# Patient Record
Sex: MALE | Race: BLACK OR AFRICAN AMERICAN | Employment: UNEMPLOYED | URBAN - METROPOLITAN AREA
[De-identification: names, ages, dates, MRNs, and addresses within clinical notes are randomized per-mention and may not be internally consistent; named-entity substitution may affect disease eponyms.]

---

## 2017-01-01 ENCOUNTER — CARE COORDINATION (OUTPATIENT)
Dept: CARE COORDINATION | Age: 82
End: 2017-01-01

## 2017-01-01 ENCOUNTER — TELEPHONE (OUTPATIENT)
Dept: INTERNAL MEDICINE CLINIC | Age: 82
End: 2017-01-01

## 2017-01-01 ENCOUNTER — HOSPITAL ENCOUNTER (OUTPATIENT)
Dept: OTHER | Age: 82
Discharge: HOME | End: 2017-03-30
Attending: INTERNAL MEDICINE | Admitting: INTERNAL MEDICINE

## 2017-01-01 ENCOUNTER — OFFICE VISIT (OUTPATIENT)
Dept: INTERNAL MEDICINE CLINIC | Age: 82
End: 2017-01-01

## 2017-01-01 ENCOUNTER — OFFICE VISIT (OUTPATIENT)
Dept: CARDIOLOGY CLINIC | Age: 82
End: 2017-01-01

## 2017-01-01 ENCOUNTER — OFFICE VISIT (OUTPATIENT)
Dept: VASCULAR SURGERY | Age: 82
End: 2017-01-01

## 2017-01-01 ENCOUNTER — PROCEDURE VISIT (OUTPATIENT)
Dept: VASCULAR SURGERY | Age: 82
End: 2017-01-01

## 2017-01-01 ENCOUNTER — HOSPITAL ENCOUNTER (OUTPATIENT)
Dept: MRI IMAGING | Age: 82
Discharge: HOME | End: 2017-03-31
Attending: INTERNAL MEDICINE | Admitting: INTERNAL MEDICINE

## 2017-01-01 VITALS
RESPIRATION RATE: 16 BRPM | HEART RATE: 71 BPM | SYSTOLIC BLOOD PRESSURE: 142 MMHG | BODY MASS INDEX: 23.34 KG/M2 | WEIGHT: 181.8 LBS | DIASTOLIC BLOOD PRESSURE: 60 MMHG

## 2017-01-01 VITALS — SYSTOLIC BLOOD PRESSURE: 135 MMHG | OXYGEN SATURATION: 96 % | DIASTOLIC BLOOD PRESSURE: 61 MMHG | HEART RATE: 87 BPM

## 2017-01-01 VITALS
RESPIRATION RATE: 14 BRPM | HEART RATE: 67 BPM | DIASTOLIC BLOOD PRESSURE: 55 MMHG | OXYGEN SATURATION: 100 % | SYSTOLIC BLOOD PRESSURE: 117 MMHG

## 2017-01-01 VITALS
DIASTOLIC BLOOD PRESSURE: 80 MMHG | HEIGHT: 74 IN | WEIGHT: 195 LBS | BODY MASS INDEX: 25.03 KG/M2 | SYSTOLIC BLOOD PRESSURE: 128 MMHG

## 2017-01-01 VITALS — DIASTOLIC BLOOD PRESSURE: 59 MMHG | HEART RATE: 59 BPM | RESPIRATION RATE: 14 BRPM | SYSTOLIC BLOOD PRESSURE: 136 MMHG

## 2017-01-01 VITALS
WEIGHT: 195 LBS | DIASTOLIC BLOOD PRESSURE: 61 MMHG | BODY MASS INDEX: 25.04 KG/M2 | OXYGEN SATURATION: 100 % | SYSTOLIC BLOOD PRESSURE: 146 MMHG | HEART RATE: 70 BPM

## 2017-01-01 VITALS
RESPIRATION RATE: 16 BRPM | DIASTOLIC BLOOD PRESSURE: 71 MMHG | SYSTOLIC BLOOD PRESSURE: 163 MMHG | TEMPERATURE: 98 F | HEART RATE: 82 BPM

## 2017-01-01 VITALS
BODY MASS INDEX: 25.03 KG/M2 | DIASTOLIC BLOOD PRESSURE: 50 MMHG | WEIGHT: 195 LBS | SYSTOLIC BLOOD PRESSURE: 118 MMHG | HEIGHT: 74 IN

## 2017-01-01 DIAGNOSIS — K21.9 GASTROESOPHAGEAL REFLUX DISEASE WITHOUT ESOPHAGITIS: ICD-10-CM

## 2017-01-01 DIAGNOSIS — I70.634: Primary | ICD-10-CM

## 2017-01-01 DIAGNOSIS — I73.9 PAD (PERIPHERAL ARTERY DISEASE) (HCC): Primary | ICD-10-CM

## 2017-01-01 DIAGNOSIS — N18.30 STAGE 3 CHRONIC KIDNEY DISEASE (HCC): ICD-10-CM

## 2017-01-01 DIAGNOSIS — M54.50 CHRONIC BILATERAL LOW BACK PAIN WITHOUT SCIATICA: ICD-10-CM

## 2017-01-01 DIAGNOSIS — Z48.812 ENCOUNTER FOR POSTOPERATIVE SURVEILLANCE OF VASCULAR BYPASS SURGERY: Primary | ICD-10-CM

## 2017-01-01 DIAGNOSIS — M86.271 SUBACUTE OSTEOMYELITIS OF RIGHT FOOT (HCC): ICD-10-CM

## 2017-01-01 DIAGNOSIS — G89.29 CHRONIC BILATERAL LOW BACK PAIN WITHOUT SCIATICA: ICD-10-CM

## 2017-01-01 DIAGNOSIS — D50.0 IRON DEFICIENCY ANEMIA DUE TO CHRONIC BLOOD LOSS: ICD-10-CM

## 2017-01-01 DIAGNOSIS — L89.153 SACRAL DECUBITUS ULCER, STAGE III (HCC): Primary | ICD-10-CM

## 2017-01-01 DIAGNOSIS — D64.9 NORMOCYTIC ANEMIA: ICD-10-CM

## 2017-01-01 DIAGNOSIS — R91.8 INDETERMINATE PULMONARY NODULES: ICD-10-CM

## 2017-01-01 DIAGNOSIS — D64.9 NORMOCYTIC ANEMIA: Primary | ICD-10-CM

## 2017-01-01 DIAGNOSIS — I48.0 PAROXYSMAL ATRIAL FIBRILLATION (HCC): Primary | ICD-10-CM

## 2017-01-01 DIAGNOSIS — I73.9 PAD (PERIPHERAL ARTERY DISEASE) (HCC): ICD-10-CM

## 2017-01-01 DIAGNOSIS — I10 ESSENTIAL HYPERTENSION: Primary | ICD-10-CM

## 2017-01-01 DIAGNOSIS — R60.0 BILATERAL EDEMA OF LOWER EXTREMITY: ICD-10-CM

## 2017-01-01 DIAGNOSIS — I48.20 CHRONIC ATRIAL FIBRILLATION (HCC): ICD-10-CM

## 2017-01-01 DIAGNOSIS — M79.671 RIGHT FOOT PAIN: ICD-10-CM

## 2017-01-01 DIAGNOSIS — M24.561 FLEXION CONTRACTURE OF RIGHT KNEE: ICD-10-CM

## 2017-01-01 DIAGNOSIS — R93.6 ABNORMAL X-RAY OF EXTREMITY: ICD-10-CM

## 2017-01-01 DIAGNOSIS — Z23 NEED FOR PROPHYLACTIC VACCINATION AGAINST STREPTOCOCCUS PNEUMONIAE (PNEUMOCOCCUS): ICD-10-CM

## 2017-01-01 DIAGNOSIS — I10 ESSENTIAL HYPERTENSION: ICD-10-CM

## 2017-01-01 DIAGNOSIS — R93.6 ABNORMAL X-RAY OF EXTREMITY: Primary | ICD-10-CM

## 2017-01-01 DIAGNOSIS — I10 HTN (HYPERTENSION), BENIGN: ICD-10-CM

## 2017-01-01 DIAGNOSIS — I48.0 PAROXYSMAL ATRIAL FIBRILLATION (HCC): ICD-10-CM

## 2017-01-01 DIAGNOSIS — I73.9 PVD (PERIPHERAL VASCULAR DISEASE) (HCC): ICD-10-CM

## 2017-01-01 DIAGNOSIS — D64.9 ANEMIA, UNSPECIFIED TYPE: Primary | ICD-10-CM

## 2017-01-01 DIAGNOSIS — R73.09 ELEVATED GLUCOSE: ICD-10-CM

## 2017-01-01 DIAGNOSIS — D51.9 ANEMIA DUE TO VITAMIN B12 DEFICIENCY, UNSPECIFIED B12 DEFICIENCY TYPE: ICD-10-CM

## 2017-01-01 DIAGNOSIS — H40.9 GLAUCOMA, UNSPECIFIED GLAUCOMA, UNSPECIFIED LATERALITY: ICD-10-CM

## 2017-01-01 DIAGNOSIS — N18.3 CKD (CHRONIC KIDNEY DISEASE), STAGE 3 (MODERATE): ICD-10-CM

## 2017-01-01 DIAGNOSIS — C32.0 MALIGNANT NEOPLASM OF GLOTTIS (HCC): ICD-10-CM

## 2017-01-01 DIAGNOSIS — R93.7 ABNORMAL FINDINGS ON DIAGNOSTIC IMAGING OF OTHER PARTS OF MUSCULOSKELETAL SYSTEM: ICD-10-CM

## 2017-01-01 DIAGNOSIS — Z23 NEED FOR INFLUENZA VACCINATION: ICD-10-CM

## 2017-01-01 DIAGNOSIS — D64.9 ANEMIA, NORMOCYTIC NORMOCHROMIC: Primary | ICD-10-CM

## 2017-01-01 DIAGNOSIS — I25.10 ATHEROSCLEROSIS OF NATIVE CORONARY ARTERY OF NATIVE HEART WITHOUT ANGINA PECTORIS: ICD-10-CM

## 2017-01-01 DIAGNOSIS — D64.9 ANEMIA, NORMOCYTIC NORMOCHROMIC: ICD-10-CM

## 2017-01-01 DIAGNOSIS — R73.03 PREDIABETES: ICD-10-CM

## 2017-01-01 DIAGNOSIS — L97.919 ULCER OF RIGHT LEG, WITH UNSPECIFIED SEVERITY (HCC): ICD-10-CM

## 2017-01-01 DIAGNOSIS — I63.50 CEREBRAL ARTERY OCCLUSION WITH CEREBRAL INFARCTION (HCC): ICD-10-CM

## 2017-01-01 DIAGNOSIS — E78.00 PURE HYPERCHOLESTEROLEMIA: ICD-10-CM

## 2017-01-01 DIAGNOSIS — I70.261 ATHEROSCLEROSIS OF NATIVE ARTERY OF RIGHT LOWER EXTREMITY WITH GANGRENE (HCC): Primary | ICD-10-CM

## 2017-01-01 DIAGNOSIS — I70.261 ATHEROSCLEROSIS OF NATIVE ARTERY OF RIGHT LOWER EXTREMITY WITH GANGRENE (HCC): ICD-10-CM

## 2017-01-01 LAB
A/G RATIO: 0.6 (ref 1.1–2.2)
A/G RATIO: 0.7 (ref 1.1–2.2)
A/G RATIO: 0.7 (ref 1.1–2.2)
A/G RATIO: 0.8 (ref 1.1–2.2)
ALBUMIN SERPL-MCNC: 2.4 G/DL (ref 3.1–4.9)
ALBUMIN SERPL-MCNC: 2.9 G/DL (ref 3.4–5)
ALBUMIN SERPL-MCNC: 3 G/DL (ref 3.4–5)
ALBUMIN SERPL-MCNC: 3 G/DL (ref 3.4–5)
ALBUMIN SERPL-MCNC: 3.1 G/DL (ref 3.4–5)
ALP BLD-CCNC: 107 U/L (ref 40–129)
ALP BLD-CCNC: 107 U/L (ref 40–129)
ALP BLD-CCNC: 118 U/L (ref 40–129)
ALP BLD-CCNC: 150 U/L (ref 40–129)
ALPHA-1-GLOBULIN: 0.4 G/DL (ref 0.2–0.4)
ALPHA-2-GLOBULIN: 1.2 G/DL (ref 0.4–1.1)
ALT SERPL-CCNC: 10 U/L (ref 10–40)
ALT SERPL-CCNC: 10 U/L (ref 10–40)
ALT SERPL-CCNC: <5 U/L (ref 10–40)
ALT SERPL-CCNC: <5 U/L (ref 10–40)
ANION GAP SERPL CALCULATED.3IONS-SCNC: 14 MMOL/L (ref 3–16)
ANION GAP SERPL CALCULATED.3IONS-SCNC: 15 MMOL/L (ref 3–16)
ANION GAP SERPL CALCULATED.3IONS-SCNC: 16 MMOL/L (ref 3–16)
ANION GAP SERPL CALCULATED.3IONS-SCNC: 20 MMOL/L (ref 3–16)
AST SERPL-CCNC: 10 U/L (ref 15–37)
AST SERPL-CCNC: 10 U/L (ref 15–37)
AST SERPL-CCNC: 13 U/L (ref 15–37)
AST SERPL-CCNC: 14 U/L (ref 15–37)
BASOPHILS ABSOLUTE: 0 K/UL (ref 0–0.2)
BASOPHILS ABSOLUTE: 0 K/UL (ref 0–0.2)
BASOPHILS ABSOLUTE: 0.1 K/UL (ref 0–0.2)
BASOPHILS ABSOLUTE: 0.1 K/UL (ref 0–0.2)
BASOPHILS RELATIVE PERCENT: 0.6 %
BASOPHILS RELATIVE PERCENT: 0.9 %
BASOPHILS RELATIVE PERCENT: 1 %
BASOPHILS RELATIVE PERCENT: 1.3 %
BETA GLOBULIN: 1.4 G/DL (ref 0.9–1.6)
BILIRUB SERPL-MCNC: 0.3 MG/DL (ref 0–1)
BILIRUB SERPL-MCNC: <0.2 MG/DL (ref 0–1)
BUN BLDV-MCNC: 12 MG/DL (ref 7–20)
BUN BLDV-MCNC: 12 MG/DL (ref 7–20)
BUN BLDV-MCNC: 28 MG/DL (ref 7–20)
BUN BLDV-MCNC: 9 MG/DL (ref 7–20)
C-REACTIVE PROTEIN: 68.2 MG/L (ref 0–5.1)
CALCIUM SERPL-MCNC: 8.4 MG/DL (ref 8.3–10.6)
CALCIUM SERPL-MCNC: 8.8 MG/DL (ref 8.3–10.6)
CALCIUM SERPL-MCNC: 9 MG/DL (ref 8.3–10.6)
CALCIUM SERPL-MCNC: 9.2 MG/DL (ref 8.3–10.6)
CHLORIDE BLD-SCNC: 101 MMOL/L (ref 99–110)
CHLORIDE BLD-SCNC: 101 MMOL/L (ref 99–110)
CHLORIDE BLD-SCNC: 104 MMOL/L (ref 99–110)
CHLORIDE BLD-SCNC: 105 MMOL/L (ref 99–110)
CHOLESTEROL, TOTAL: 126 MG/DL
CHOLESTEROL/HDL RATIO: 2.2
CO2: 17 MMOL/L (ref 21–32)
CO2: 20 MMOL/L (ref 21–32)
CO2: 22 MMOL/L (ref 21–32)
CO2: 25 MMOL/L (ref 21–32)
CREAT SERPL-MCNC: 0.7 MG/DL (ref 0.8–1.3)
CREAT SERPL-MCNC: 0.9 MG/DL (ref 0.8–1.3)
EOSINOPHILS ABSOLUTE: 0.2 K/UL (ref 0–0.6)
EOSINOPHILS ABSOLUTE: 0.2 K/UL (ref 0–0.6)
EOSINOPHILS ABSOLUTE: 0.3 K/UL (ref 0–0.6)
EOSINOPHILS ABSOLUTE: 0.3 K/UL (ref 0–0.6)
EOSINOPHILS RELATIVE PERCENT: 2.9 %
EOSINOPHILS RELATIVE PERCENT: 3.4 %
EOSINOPHILS RELATIVE PERCENT: 3.7 %
EOSINOPHILS RELATIVE PERCENT: 5.5 %
ESTIMATED AVERAGE GLUCOSE: 119.8 MG/DL
FERRITIN: 575.5 NG/ML (ref 30–400)
FERRITIN: 861.6 NG/ML (ref 30–400)
FERRITIN: 867.6 NG/ML (ref 30–400)
FOLATE: >20 NG/ML (ref 4.78–24.2)
GAMMA GLOBULIN: 2.4 G/DL (ref 0.6–1.8)
GFR AFRICAN AMERICAN: >60
GFR NON-AFRICAN AMERICAN: >60
GLOBULIN: 3.9 G/DL
GLOBULIN: 4.3 G/DL
GLOBULIN: 4.3 G/DL
GLOBULIN: 4.8 G/DL
GLUCOSE BLD-MCNC: 84 MG/DL (ref 70–99)
GLUCOSE BLD-MCNC: 87 MG/DL (ref 70–99)
GLUCOSE BLD-MCNC: 92 MG/DL (ref 70–99)
GLUCOSE BLD-MCNC: 94 MG/DL (ref 70–99)
GRAM STAIN RESULT: ABNORMAL
HAPTOGLOBIN: 426 MG/DL (ref 30–200)
HBA1C MFR BLD: 5.8 %
HCT VFR BLD CALC: 26.7 % (ref 40.5–52.5)
HCT VFR BLD CALC: 27.9 % (ref 40.5–52.5)
HCT VFR BLD CALC: 29.7 % (ref 40.5–52.5)
HCT VFR BLD CALC: 31.2 % (ref 40.5–52.5)
HDLC SERPL-MCNC: 58 MG/DL (ref 35–70)
HEMOGLOBIN: 10.1 G/DL (ref 13.5–17.5)
HEMOGLOBIN: 8.3 G/DL (ref 13.5–17.5)
HEMOGLOBIN: 8.5 G/DL (ref 13.5–17.5)
HEMOGLOBIN: 9.8 G/DL (ref 13.5–17.5)
IRON SATURATION: 17 % (ref 20–50)
IRON SATURATION: 25 % (ref 20–50)
IRON SATURATION: 37 % (ref 20–50)
IRON: 30 UG/DL (ref 59–158)
IRON: 50 UG/DL (ref 59–158)
IRON: 68 UG/DL (ref 59–158)
LDL CHOLESTEROL CALCULATED: 54 MG/DL (ref 0–160)
LYMPHOCYTES ABSOLUTE: 1 K/UL (ref 1–5.1)
LYMPHOCYTES ABSOLUTE: 1.1 K/UL (ref 1–5.1)
LYMPHOCYTES ABSOLUTE: 1.4 K/UL (ref 1–5.1)
LYMPHOCYTES ABSOLUTE: 1.7 K/UL (ref 1–5.1)
LYMPHOCYTES RELATIVE PERCENT: 20.4 %
LYMPHOCYTES RELATIVE PERCENT: 21.5 %
LYMPHOCYTES RELATIVE PERCENT: 22.5 %
LYMPHOCYTES RELATIVE PERCENT: 23.7 %
MCH RBC QN AUTO: 28.5 PG (ref 26–34)
MCH RBC QN AUTO: 29.3 PG (ref 26–34)
MCH RBC QN AUTO: 30.8 PG (ref 26–34)
MCH RBC QN AUTO: 31 PG (ref 26–34)
MCHC RBC AUTO-ENTMCNC: 29.9 G/DL (ref 31–36)
MCHC RBC AUTO-ENTMCNC: 31.7 G/DL (ref 31–36)
MCHC RBC AUTO-ENTMCNC: 32.5 G/DL (ref 31–36)
MCHC RBC AUTO-ENTMCNC: 33.1 G/DL (ref 31–36)
MCV RBC AUTO: 92.2 FL (ref 80–100)
MCV RBC AUTO: 93.6 FL (ref 80–100)
MCV RBC AUTO: 94.6 FL (ref 80–100)
MCV RBC AUTO: 95.5 FL (ref 80–100)
MONOCYTES ABSOLUTE: 0.4 K/UL (ref 0–1.3)
MONOCYTES ABSOLUTE: 0.5 K/UL (ref 0–1.3)
MONOCYTES ABSOLUTE: 0.5 K/UL (ref 0–1.3)
MONOCYTES ABSOLUTE: 0.6 K/UL (ref 0–1.3)
MONOCYTES RELATIVE PERCENT: 7.9 %
MONOCYTES RELATIVE PERCENT: 8.2 %
MONOCYTES RELATIVE PERCENT: 8.7 %
MONOCYTES RELATIVE PERCENT: 9.8 %
NEUTROPHILS ABSOLUTE: 3.1 K/UL (ref 1.7–7.7)
NEUTROPHILS ABSOLUTE: 3.2 K/UL (ref 1.7–7.7)
NEUTROPHILS ABSOLUTE: 4.5 K/UL (ref 1.7–7.7)
NEUTROPHILS ABSOLUTE: 4.5 K/UL (ref 1.7–7.7)
NEUTROPHILS RELATIVE PERCENT: 61.3 %
NEUTROPHILS RELATIVE PERCENT: 62.9 %
NEUTROPHILS RELATIVE PERCENT: 65.6 %
NEUTROPHILS RELATIVE PERCENT: 68.2 %
ORGANISM: ABNORMAL
PDW BLD-RTO: 15.2 % (ref 12.4–15.4)
PDW BLD-RTO: 18.7 % (ref 12.4–15.4)
PDW BLD-RTO: 19.8 % (ref 12.4–15.4)
PDW BLD-RTO: 21.1 % (ref 12.4–15.4)
PLATELET # BLD: 216 K/UL (ref 135–450)
PLATELET # BLD: 259 K/UL (ref 135–450)
PLATELET # BLD: 379 K/UL (ref 135–450)
PLATELET # BLD: 389 K/UL (ref 135–450)
PMV BLD AUTO: 8.8 FL (ref 5–10.5)
PMV BLD AUTO: 8.8 FL (ref 5–10.5)
PMV BLD AUTO: 9 FL (ref 5–10.5)
PMV BLD AUTO: 9.4 FL (ref 5–10.5)
POTASSIUM SERPL-SCNC: 4.1 MMOL/L (ref 3.5–5.1)
POTASSIUM SERPL-SCNC: 4.1 MMOL/L (ref 3.5–5.1)
POTASSIUM SERPL-SCNC: 4.4 MMOL/L (ref 3.5–5.1)
POTASSIUM SERPL-SCNC: 4.6 MMOL/L (ref 3.5–5.1)
RBC # BLD: 2.9 M/UL (ref 4.2–5.9)
RBC # BLD: 2.92 M/UL (ref 4.2–5.9)
RBC # BLD: 3.17 M/UL (ref 4.2–5.9)
RBC # BLD: 3.29 M/UL (ref 4.2–5.9)
REASON FOR REJECTION: NORMAL
REJECTED TEST: NORMAL
SEDIMENTATION RATE, ERYTHROCYTE: 47 MM/HR (ref 0–20)
SODIUM BLD-SCNC: 139 MMOL/L (ref 136–145)
SODIUM BLD-SCNC: 140 MMOL/L (ref 136–145)
SODIUM BLD-SCNC: 140 MMOL/L (ref 136–145)
SODIUM BLD-SCNC: 141 MMOL/L (ref 136–145)
SPE/IFE INTERPRETATION: NORMAL
TOTAL IRON BINDING CAPACITY: 172 UG/DL (ref 260–445)
TOTAL IRON BINDING CAPACITY: 186 UG/DL (ref 260–445)
TOTAL IRON BINDING CAPACITY: 201 UG/DL (ref 260–445)
TOTAL PROTEIN: 7 G/DL (ref 6.4–8.2)
TOTAL PROTEIN: 7.2 G/DL (ref 6.4–8.2)
TOTAL PROTEIN: 7.3 G/DL (ref 6.4–8.2)
TOTAL PROTEIN: 7.8 G/DL (ref 6.4–8.2)
TRIGL SERPL-MCNC: 68 MG/DL
TSH SERPL DL<=0.05 MIU/L-ACNC: 1.26 UIU/ML (ref 0.27–4.2)
URIC ACID, SERUM: 5.1 MG/DL (ref 3.5–7.2)
VITAMIN B-12: 1443 PG/ML (ref 211–911)
VITAMIN B-12: 475
VLDLC SERPL CALC-MCNC: NORMAL MG/DL
WBC # BLD: 4.8 K/UL (ref 4–11)
WBC # BLD: 5 K/UL (ref 4–11)
WBC # BLD: 6.7 K/UL (ref 4–11)
WBC # BLD: 7.2 K/UL (ref 4–11)
WOUND/ABSCESS: ABNORMAL

## 2017-01-01 PROCEDURE — 99214 OFFICE O/P EST MOD 30 MIN: CPT | Performed by: INTERNAL MEDICINE

## 2017-01-01 PROCEDURE — 90670 PCV13 VACCINE IM: CPT | Performed by: INTERNAL MEDICINE

## 2017-01-01 PROCEDURE — 93926 LOWER EXTREMITY STUDY: CPT | Performed by: SURGERY

## 2017-01-01 PROCEDURE — 99215 OFFICE O/P EST HI 40 MIN: CPT | Performed by: INTERNAL MEDICINE

## 2017-01-01 PROCEDURE — 90662 IIV NO PRSV INCREASED AG IM: CPT | Performed by: INTERNAL MEDICINE

## 2017-01-01 PROCEDURE — 99213 OFFICE O/P EST LOW 20 MIN: CPT | Performed by: SURGERY

## 2017-01-01 PROCEDURE — G0009 ADMIN PNEUMOCOCCAL VACCINE: HCPCS | Performed by: INTERNAL MEDICINE

## 2017-01-01 PROCEDURE — 99024 POSTOP FOLLOW-UP VISIT: CPT | Performed by: SURGERY

## 2017-01-01 PROCEDURE — G0008 ADMIN INFLUENZA VIRUS VAC: HCPCS | Performed by: INTERNAL MEDICINE

## 2017-01-01 RX ORDER — LISINOPRIL 10 MG/1
10 TABLET ORAL DAILY
Qty: 90 TABLET | Refills: 3 | Status: SHIPPED | OUTPATIENT
Start: 2017-01-01 | End: 2017-01-01 | Stop reason: SDUPTHER

## 2017-01-01 RX ORDER — OXYCODONE HYDROCHLORIDE AND ACETAMINOPHEN 5; 325 MG/1; MG/1
1 TABLET ORAL 2 TIMES DAILY PRN
Qty: 60 TABLET | Refills: 0 | Status: CANCELLED | OUTPATIENT
Start: 2017-01-01

## 2017-01-01 RX ORDER — DILTIAZEM HYDROCHLORIDE 120 MG/1
120 CAPSULE, COATED, EXTENDED RELEASE ORAL DAILY
Qty: 30 CAPSULE | Refills: 5 | Status: SHIPPED | OUTPATIENT
Start: 2017-01-01 | End: 2017-01-01 | Stop reason: SDUPTHER

## 2017-01-01 RX ORDER — DILTIAZEM HYDROCHLORIDE 120 MG/1
120 CAPSULE, COATED, EXTENDED RELEASE ORAL DAILY
Qty: 30 CAPSULE | Refills: 0 | Status: SHIPPED | OUTPATIENT
Start: 2017-01-01 | End: 2017-01-01 | Stop reason: SDUPTHER

## 2017-01-01 RX ORDER — ALLOPURINOL 100 MG/1
100 TABLET ORAL DAILY
Qty: 30 TABLET | Refills: 0 | Status: SHIPPED | OUTPATIENT
Start: 2017-01-01 | End: 2017-01-01 | Stop reason: SDUPTHER

## 2017-01-01 RX ORDER — GABAPENTIN 100 MG/1
100 CAPSULE ORAL 3 TIMES DAILY
Qty: 90 CAPSULE | Refills: 0 | Status: SHIPPED | OUTPATIENT
Start: 2017-01-01 | End: 2017-01-01 | Stop reason: SDUPTHER

## 2017-01-01 RX ORDER — FUROSEMIDE 20 MG/1
20 TABLET ORAL DAILY
Qty: 30 TABLET | Refills: 5 | Status: SHIPPED | OUTPATIENT
Start: 2017-01-01 | End: 2017-01-01 | Stop reason: SDUPTHER

## 2017-01-01 RX ORDER — GABAPENTIN 100 MG/1
100 CAPSULE ORAL 3 TIMES DAILY
Qty: 90 CAPSULE | Refills: 0 | Status: SHIPPED | OUTPATIENT
Start: 2017-01-01 | End: 2017-01-01

## 2017-01-01 RX ORDER — FOLIC ACID 1 MG/1
TABLET ORAL
Qty: 28 TABLET | Refills: 5 | Status: SHIPPED | OUTPATIENT
Start: 2017-01-01 | End: 2017-01-01 | Stop reason: SDUPTHER

## 2017-01-01 RX ORDER — OXYCODONE HYDROCHLORIDE AND ACETAMINOPHEN 5; 325 MG/1; MG/1
1 TABLET ORAL 2 TIMES DAILY PRN
Qty: 60 TABLET | Refills: 0 | Status: SHIPPED | OUTPATIENT
Start: 2017-01-01 | End: 2017-01-01 | Stop reason: CLARIF

## 2017-01-01 RX ORDER — ALLOPURINOL 100 MG/1
100 TABLET ORAL DAILY
Qty: 30 TABLET | Refills: 5 | Status: SHIPPED | OUTPATIENT
Start: 2017-01-01

## 2017-01-01 RX ORDER — FERROUS SULFATE 325(65) MG
325 TABLET ORAL
Qty: 90 TABLET | Refills: 0 | Status: SHIPPED | OUTPATIENT
Start: 2017-01-01 | End: 2017-01-01 | Stop reason: DRUGHIGH

## 2017-01-01 RX ORDER — ALLOPURINOL 100 MG/1
100 TABLET ORAL DAILY
Qty: 30 TABLET | Refills: 5 | Status: SHIPPED | OUTPATIENT
Start: 2017-01-01 | End: 2017-01-01 | Stop reason: SDUPTHER

## 2017-01-01 RX ORDER — OXYCODONE HYDROCHLORIDE AND ACETAMINOPHEN 5; 325 MG/1; MG/1
1 TABLET ORAL 2 TIMES DAILY PRN
Qty: 60 TABLET | Refills: 0 | Status: SHIPPED | OUTPATIENT
Start: 2017-01-01 | End: 2017-01-01 | Stop reason: SDUPTHER

## 2017-01-01 RX ORDER — LISINOPRIL 5 MG/1
5 TABLET ORAL DAILY
Qty: 30 TABLET | Refills: 5 | Status: SHIPPED | OUTPATIENT
Start: 2017-01-01 | End: 2017-01-01 | Stop reason: SDUPTHER

## 2017-01-01 RX ORDER — DILTIAZEM HYDROCHLORIDE 120 MG/1
120 CAPSULE, COATED, EXTENDED RELEASE ORAL DAILY
Qty: 30 CAPSULE | Refills: 5 | Status: SHIPPED | OUTPATIENT
Start: 2017-01-01

## 2017-01-01 RX ORDER — CEFUROXIME AXETIL 500 MG/1
500 TABLET ORAL 2 TIMES DAILY
COMMUNITY
Start: 2017-01-01 | End: 2017-01-01

## 2017-01-01 RX ORDER — FUROSEMIDE 20 MG/1
20 TABLET ORAL DAILY
Qty: 30 TABLET | Refills: 5 | Status: SHIPPED | OUTPATIENT
Start: 2017-01-01

## 2017-01-01 RX ORDER — ATORVASTATIN CALCIUM 80 MG/1
TABLET, FILM COATED ORAL
Qty: 30 TABLET | Refills: 5 | Status: SHIPPED | OUTPATIENT
Start: 2017-01-01 | End: 2017-01-01 | Stop reason: SDUPTHER

## 2017-01-01 RX ORDER — ATORVASTATIN CALCIUM 80 MG/1
TABLET, FILM COATED ORAL
Qty: 30 TABLET | Refills: 5 | Status: ON HOLD | OUTPATIENT
Start: 2017-01-01 | End: 2018-01-01 | Stop reason: HOSPADM

## 2017-01-01 RX ORDER — ACETAMINOPHEN 325 MG/1
650 TABLET ORAL EVERY 6 HOURS PRN
COMMUNITY

## 2017-01-01 RX ORDER — PANTOPRAZOLE SODIUM 40 MG/1
TABLET, DELAYED RELEASE ORAL
Qty: 30 TABLET | Refills: 5 | Status: SHIPPED | OUTPATIENT
Start: 2017-01-01 | End: 2017-01-01 | Stop reason: SDUPTHER

## 2017-01-01 RX ORDER — FERROUS SULFATE 325(65) MG
325 TABLET ORAL
Qty: 90 TABLET | Refills: 3 | Status: SHIPPED | OUTPATIENT
Start: 2017-01-01 | End: 2017-01-01 | Stop reason: SDUPTHER

## 2017-01-01 RX ORDER — FERROUS SULFATE 325(65) MG
325 TABLET ORAL
Qty: 90 TABLET | Refills: 0 | Status: ON HOLD | OUTPATIENT
Start: 2017-01-01 | End: 2018-01-01 | Stop reason: HOSPADM

## 2017-01-01 RX ORDER — TAMSULOSIN HYDROCHLORIDE 0.4 MG/1
0.4 CAPSULE ORAL NIGHTLY
Qty: 30 CAPSULE | Refills: 5 | Status: SHIPPED | OUTPATIENT
Start: 2017-01-01 | End: 2017-01-01 | Stop reason: SDUPTHER

## 2017-01-01 RX ORDER — FERROUS SULFATE 325(65) MG
TABLET ORAL
Qty: 28 TABLET | Refills: 5 | Status: SHIPPED | OUTPATIENT
Start: 2017-01-01 | End: 2017-01-01 | Stop reason: SDUPTHER

## 2017-01-01 RX ORDER — FUROSEMIDE 20 MG/1
20 TABLET ORAL DAILY
COMMUNITY
End: 2017-01-01 | Stop reason: SDUPTHER

## 2017-01-01 RX ORDER — FOLIC ACID 1 MG/1
TABLET ORAL
Qty: 28 TABLET | Refills: 5 | Status: ON HOLD | OUTPATIENT
Start: 2017-01-01 | End: 2018-01-01 | Stop reason: HOSPADM

## 2017-01-01 RX ORDER — DOXYCYCLINE HYCLATE 100 MG/1
100 CAPSULE ORAL 2 TIMES DAILY
Qty: 20 CAPSULE | Refills: 0 | Status: SHIPPED | OUTPATIENT
Start: 2017-01-01 | End: 2017-01-01

## 2017-01-01 RX ORDER — PANTOPRAZOLE SODIUM 40 MG/1
TABLET, DELAYED RELEASE ORAL
Qty: 30 TABLET | Refills: 5 | Status: SHIPPED | OUTPATIENT
Start: 2017-01-01

## 2017-01-01 RX ORDER — DOCUSATE SODIUM 100 MG/1
CAPSULE, LIQUID FILLED ORAL
Qty: 56 CAPSULE | Refills: 5 | Status: SHIPPED | OUTPATIENT
Start: 2017-01-01

## 2017-01-01 RX ORDER — BACLOFEN 10 MG/1
10 TABLET ORAL 2 TIMES DAILY
Qty: 60 TABLET | Refills: 5 | Status: SHIPPED | OUTPATIENT
Start: 2017-01-01 | End: 2017-01-01 | Stop reason: SDUPTHER

## 2017-01-01 RX ORDER — POTASSIUM CHLORIDE 20 MEQ/1
20 TABLET, EXTENDED RELEASE ORAL DAILY
Qty: 30 TABLET | Refills: 5 | Status: ON HOLD | OUTPATIENT
Start: 2017-01-01 | End: 2018-01-01 | Stop reason: HOSPADM

## 2017-01-01 RX ORDER — LISINOPRIL 30 MG/1
30 TABLET ORAL DAILY
Qty: 30 TABLET | Refills: 5 | Status: SHIPPED | OUTPATIENT
Start: 2017-01-01 | End: 2017-01-01 | Stop reason: SDUPTHER

## 2017-01-01 RX ORDER — FERROUS SULFATE 325(65) MG
325 TABLET ORAL
Qty: 90 TABLET | Refills: 0 | Status: SHIPPED | OUTPATIENT
Start: 2017-01-01 | End: 2017-01-01 | Stop reason: SDUPTHER

## 2017-01-01 RX ORDER — BACLOFEN 10 MG/1
10 TABLET ORAL 2 TIMES DAILY
Qty: 60 TABLET | Refills: 5 | Status: SHIPPED | OUTPATIENT
Start: 2017-01-01

## 2017-01-01 RX ORDER — POTASSIUM CHLORIDE 20 MEQ/1
20 TABLET, EXTENDED RELEASE ORAL DAILY
Qty: 30 TABLET | Refills: 5 | Status: SHIPPED | OUTPATIENT
Start: 2017-01-01 | End: 2017-01-01 | Stop reason: SDUPTHER

## 2017-01-01 RX ORDER — LISINOPRIL 30 MG/1
30 TABLET ORAL DAILY
Qty: 30 TABLET | Refills: 5 | Status: SHIPPED | OUTPATIENT
Start: 2017-01-01

## 2017-01-01 RX ORDER — BIMATOPROST 0.3 MG/ML
1 SOLUTION/ DROPS OPHTHALMIC NIGHTLY
COMMUNITY

## 2017-01-01 RX ORDER — DOCUSATE SODIUM 100 MG/1
CAPSULE, LIQUID FILLED ORAL
Qty: 56 CAPSULE | Refills: 5 | Status: SHIPPED | OUTPATIENT
Start: 2017-01-01 | End: 2017-01-01 | Stop reason: SDUPTHER

## 2017-01-01 RX ORDER — PANTOPRAZOLE SODIUM 40 MG/1
TABLET, DELAYED RELEASE ORAL
Qty: 30 TABLET | Refills: 0 | Status: SHIPPED | OUTPATIENT
Start: 2017-01-01 | End: 2017-01-01 | Stop reason: SDUPTHER

## 2017-01-01 RX ORDER — LISINOPRIL 5 MG/1
5 TABLET ORAL DAILY
Qty: 30 TABLET | Refills: 0 | Status: SHIPPED | OUTPATIENT
Start: 2017-01-01 | End: 2017-01-01 | Stop reason: SDUPTHER

## 2017-01-01 RX ORDER — LISINOPRIL 20 MG/1
20 TABLET ORAL DAILY
Qty: 30 TABLET | Refills: 2 | Status: SHIPPED | OUTPATIENT
Start: 2017-01-01 | End: 2017-01-01 | Stop reason: SDUPTHER

## 2017-01-01 RX ORDER — LANOLIN ALCOHOL/MO/W.PET/CERES
1000 CREAM (GRAM) TOPICAL DAILY
Qty: 30 TABLET | Refills: 5 | Status: ON HOLD | OUTPATIENT
Start: 2017-01-01 | End: 2018-01-01 | Stop reason: HOSPADM

## 2017-01-01 RX ORDER — LANOLIN ALCOHOL/MO/W.PET/CERES
1000 CREAM (GRAM) TOPICAL DAILY
Qty: 30 TABLET | Refills: 5 | Status: SHIPPED | OUTPATIENT
Start: 2017-01-01 | End: 2017-01-01 | Stop reason: SDUPTHER

## 2017-01-01 RX ORDER — METOPROLOL SUCCINATE 50 MG/1
50 TABLET, EXTENDED RELEASE ORAL DAILY
COMMUNITY
End: 2017-01-01

## 2017-01-01 RX ORDER — POTASSIUM CHLORIDE 20 MEQ/1
20 TABLET, EXTENDED RELEASE ORAL DAILY
Qty: 30 TABLET | Refills: 0 | Status: SHIPPED | OUTPATIENT
Start: 2017-01-01 | End: 2017-01-01 | Stop reason: SDUPTHER

## 2017-01-01 RX ORDER — TAMSULOSIN HYDROCHLORIDE 0.4 MG/1
0.4 CAPSULE ORAL NIGHTLY
Qty: 30 CAPSULE | Refills: 0 | Status: SHIPPED | OUTPATIENT
Start: 2017-01-01 | End: 2017-01-01 | Stop reason: SDUPTHER

## 2017-01-01 RX ORDER — NIFEDIPINE 60 MG/1
60 TABLET, FILM COATED, EXTENDED RELEASE ORAL DAILY
COMMUNITY
End: 2017-01-01

## 2017-01-01 RX ORDER — OXYCODONE HYDROCHLORIDE AND ACETAMINOPHEN 5; 325 MG/1; MG/1
1 TABLET ORAL 2 TIMES DAILY PRN
Qty: 60 TABLET | Refills: 0 | Status: SHIPPED | OUTPATIENT
Start: 2017-01-01 | End: 2017-01-01

## 2017-01-01 RX ORDER — TAMSULOSIN HYDROCHLORIDE 0.4 MG/1
0.4 CAPSULE ORAL NIGHTLY
Qty: 30 CAPSULE | Refills: 5 | Status: SHIPPED | OUTPATIENT
Start: 2017-01-01

## 2017-01-01 ASSESSMENT — ENCOUNTER SYMPTOMS
HEMOPTYSIS: 0
VOMITING: 0
ORTHOPNEA: 0
HEARTBURN: 0
SPUTUM PRODUCTION: 0
SHORTNESS OF BREATH: 0
COUGH: 0
BLOOD IN STOOL: 0
WHEEZING: 0
ABDOMINAL PAIN: 0
NAUSEA: 0
CONSTIPATION: 0
DIARRHEA: 0

## 2017-01-04 RX ORDER — DOCUSATE SODIUM 100 MG/1
CAPSULE, LIQUID FILLED ORAL
Qty: 56 CAPSULE | Refills: 0 | Status: SHIPPED | OUTPATIENT
Start: 2017-01-04 | End: 2017-01-01 | Stop reason: SDUPTHER

## 2017-01-05 ENCOUNTER — CARE COORDINATION (OUTPATIENT)
Dept: CARE COORDINATION | Age: 82
End: 2017-01-05

## 2017-03-28 PROBLEM — M79.671 RIGHT FOOT PAIN: Status: ACTIVE | Noted: 2017-01-01

## 2017-04-01 PROBLEM — L97.519 DIABETIC ULCER OF RIGHT FOOT ASSOCIATED WITH TYPE 2 DIABETES MELLITUS (HCC): Status: ACTIVE | Noted: 2017-01-01

## 2017-04-01 PROBLEM — M86.9 OSTEOMYELITIS (HCC): Status: ACTIVE | Noted: 2017-01-01

## 2017-04-01 PROBLEM — E11.621 DIABETIC ULCER OF RIGHT FOOT ASSOCIATED WITH TYPE 2 DIABETES MELLITUS (HCC): Status: ACTIVE | Noted: 2017-01-01

## 2017-04-01 PROBLEM — M86.271 SUBACUTE OSTEOMYELITIS OF RIGHT FOOT (HCC): Status: ACTIVE | Noted: 2017-01-01

## 2017-05-24 PROBLEM — M79.671 RIGHT FOOT PAIN: Status: RESOLVED | Noted: 2017-01-01 | Resolved: 2017-01-01

## 2017-07-07 PROBLEM — D63.8 ANEMIA OF CHRONIC DISEASE: Status: ACTIVE | Noted: 2017-01-01

## 2017-07-20 PROBLEM — D50.0 IRON DEFICIENCY ANEMIA DUE TO CHRONIC BLOOD LOSS: Status: ACTIVE | Noted: 2017-01-01

## 2017-07-21 PROBLEM — K90.9 UNSPECIFIED INTESTINAL MALABSORPTION: Status: ACTIVE | Noted: 2017-01-01

## 2017-10-12 NOTE — TELEPHONE ENCOUNTER
Mike Birch stated that Dr Cordelia Boyd could not get her father a appt til 12-12. Pt is on there wait list.  Please call her at phone# provided.

## 2017-10-12 NOTE — CARE COORDINATION
Ambulatory Care Coordination Note  10/12/2017  CM Risk Score: 5  Jose Alberto Mortality Risk Score: 23.79    ACC: William Foster, RN    Summary Note:   Call to patient, familiar with from past  Patient not real forth coming with state of health, however warmed up  Saw DR Ynes Polk this week- reports edema better- elevating leg  Uses wheelchair, occasional use of prosthesis  Niece helps with housekeeping, laundry  Denies need for COA referral  Discussed possible services and abiilty for niece to contact if needed. Reports will be seeing DR Lesa Mahoney in 2 weeks as she will be out of town next week. Reports wounds on r shin and r foot are doing well. No [facility] services currently  Scheduled w Dr Bhaskar Leung in Dec.  Reports abstaining from alcohol    Plan  Follow up   Mailed out info/ RNCC card  Request OV notes : Andre Chin,          Ambulatory Care Coordination Assessment    Care Coordination Protocol  Program Enrollment:  Rising Risk  Referral from Primary Care Provider:  Yes  Week 1 - Initial Assessment     Do you have all of your prescriptions and are they filled?:  Yes  Barriers to medication adherence:  None  Are you able to afford your medications?:  Yes  How often do you have trouble taking your medications the way you have been told to take them?:  I always take them as prescribed. Do you have Home O2 Therapy?:  No      Ability to seek help/take action for Emergent Urgent situations i.e. fire, crime, inclement weather or health crisis. :  Independent  Ability to ambulate to restroom:  Independent  Ability handle personal hygeine needs (bathing/dressing/grooming): Independent  Ability to manage Medications:   Independent  Ability to prepare Food Preparation:  Independent  Ability to maintain home (clean home, laundry):  Needs Assistance  Ability to drive and/or has transportation:  Dependent  Ability to do shopping:  Needs Assistance  Ability to manage finances:  Needs Assistance  Is patient able to live independently?:  No tablet by mouth daily 9/28/17   Watson Lundberg MD   tamsulosin [facility]) 0.4 MG capsule Take 1 capsule by mouth nightly 9/28/17   Watson Lundberg MD   diltiazem [facility] CD) 120 MG extended release capsule Take 1 capsule by mouth daily 9/28/17   Watson Lundberg MD   potassium chloride (KLOR-CON M) 20 MEQ extended release tablet Take 1 tablet by mouth daily 9/28/17   Watson Lundberg MD   aspirin 81 MG tablet Take 1 tablet by mouth daily 9/28/17   Watson Lundberg MD   folic acid (FOLVITE) 1 MG tablet TAKE 1 TABLET BY MOUTH DAILY 9/28/17   Watson Lundberg MD   rivaroxaban (XARELTO) 20 MG TABS tablet Take 1 tablet by mouth every 24 hours TAKE ONE TABLET BY MOUTH AT 6 PM DAILY. 9/28/17   Watson Lundberg MD   docusate sodium (DOK) 100 MG capsule TAKE 1 CAPSULE BY MOUTH TWICE DAILY 9/28/17   Watson Lundberg MD   atorvastatin (LIPITOR) 80 MG tablet TAKE ONE TABLET BY MOUTH ONCE A DAY. 9/28/17   Watson Lundberg MD   vitamin B-12 (CYANOCOBALAMIN) 1000 MCG tablet Take 1 tablet by mouth daily 9/28/17   Watson Lundberg MD   gabapentin (NEURONTIN) 100 MG capsule Take 100 mg by mouth 3 times daily    Historical Provider, MD   baclofen (LIORESAL) 10 MG tablet Take 1 tablet by mouth 2 times daily 6/28/17   Watson Lundberg MD   brinzolamide-brimonidine [facility]) 1-0.2 % SUSP Place 1 drop into the right eye 2 times daily    Historical Provider, MD   bimatoprost (LUMIGAN) 0.03 % ophthalmic drops Place 1 drop into the right eye nightly    Historical Provider, MD   acetaminophen (TYLENOL) 325 MG tablet Take 650 mg by mouth every 6 hours as needed for Pain    Historical Provider, MD   prednisoLONE acetate (PRED FORTE) 1 % ophthalmic suspension Place 1 drop into the right eye 2 times daily. Historical Provider, MD   Multiple Vitamins-Minerals (CENTRUM SILVER) TABS Take 1 tablet by mouth daily.     Vidhi Justin Freddy Madison MD       Future Appointments  Date Time Provider Neftali Pfeiffer   11/16/2017 8:00 AM SCHEDULE, [facility]   11/21/2017 10:45 AM Shital Quintana MD [facility]   12/28/2017 9:30 AM [facility]

## 2017-11-28 NOTE — TELEPHONE ENCOUNTER
Requesting refill of oxyCODONE-acetaminophen (PERCOCET) 5-325 MG per tablet be sent to [facility] on [address]

## 2017-11-29 NOTE — TELEPHONE ENCOUNTER
Patient had last appt 9/28/17, last Rx filled 10/27/17. Does he need appt for refill, and does he need OARRS report, I don't see one in his chart?

## 2017-11-29 NOTE — TELEPHONE ENCOUNTER
Pt is calling back to see if meds have been called in ,  Pt has called several times. Please notify pt when meds have been called in.

## 2017-12-08 NOTE — CARE COORDINATION
Call to Dr Shantel Issa office re patient  Was referred for iron def anemia.     Scheduled for 12/12 for initial consult    Follow up after OV

## 2017-12-19 NOTE — CARE COORDINATION
Ambulatory Care Coordination Note  12/19/2017  CM Risk Score: 5  Jose Alberto Mortality Risk Score: 18.78    ACC: Sam Cabrera, RN    Summary Note: Call to patient reports he saw Dr Ely Davidson, GI, scheduled colonoscopy for [id number]  Reports doing pretty well    Has problems with sliding out of his wheelchair. Does not really Fall upon further investigation,  He will gently slide himself down to the floor then wait for neice/ family to come over to help  Reports that family checks on him often, but expressed that it might be helpful to them if he could get some extra help through COA/MAKENNA. Discussed RNCC making a referral on line, pt agreeable , but would like MAKENNA to call his niece, Ramiro Pires DR Ask every 2 weeks reports wounds are healing well. Has eye appt in Feb 2018   GI- colonoscopy in 2018    Denies any other needs at this time  Plan  COA online referral complete patricia Kyleigh name as   Await records DR Pringle              Care Coordination Interventions    Program Enrollment:  Rising Risk  Referral from Primary Care Provider:  Yes  Suggested Interventions and Community Resources  Fall Risk Prevention:  Completed  Disease Specific Clinic:  Completed  Home Health Services:  Completed  Medi Set or Pill Pack:  Completed  Occupational Therapy:  Completed  Physical Therapy:  Completed  Senior Services: In Process (Comment: neice- meal making, cleaning)  Specialty Services Referral:  Completed  Transportation Support:  Completed         Goals Addressed             Most Recent     Patient will utilize [facility]'s medication roll to him remember to take medication   On track (12/8/2017)          Prior to Admission medications    Medication Sig Start Date End Date Taking?  Authorizing Provider   baclofen (LIORESAL) 10 MG tablet Take 1 tablet by mouth 2 times daily 11/29/17  Yes Kiki Corbett MD   oxyCODONE-acetaminophen (PERCOCET) 5-325 MG per tablet Take 1 tablet by mouth 2 times daily as needed for Pain . 11/29/17  Yes Rosario Olivares MD   lisinopril (PRINIVIL;ZESTRIL) 30 MG tablet Take 1 tablet by mouth daily 9/28/17  Yes Rosario Olivares MD   ferrous sulfate 325 (65 Fe) MG tablet Take 1 tablet by mouth daily (with breakfast) 9/28/17  Yes Rosario Olivares MD   furosemide (LASIX) 20 MG tablet Take 1 tablet by mouth daily 9/28/17  Yes Rosario Olivares MD   pantoprazole (PROTONIX) 40 MG tablet TAKE 1 TABLET BY MOUTH EVERY DAY 9/28/17  Yes Rosario Olivares MD   allopurinol (ZYLOPRIM) 100 MG tablet Take 1 tablet by mouth daily 9/28/17  Yes Rosario Olivares MD   tamsulosin [facility]) 0.4 MG capsule Take 1 capsule by mouth nightly 9/28/17  Yes Rosario Olivares MD   diltiazem [facility] CD) 120 MG extended release capsule Take 1 capsule by mouth daily 9/28/17  Yes Rosario Olivares MD   potassium chloride (KLOR-CON M) 20 MEQ extended release tablet Take 1 tablet by mouth daily 9/28/17  Yes Rosario Olivares MD   aspirin 81 MG tablet Take 1 tablet by mouth daily 9/28/17  Yes Rosario Olivares MD   folic acid (FOLVITE) 1 MG tablet TAKE 1 TABLET BY MOUTH DAILY 9/28/17  Yes Rosario Olivares MD   rivaroxaban (XARELTO) 20 MG TABS tablet Take 1 tablet by mouth every 24 hours TAKE ONE TABLET BY MOUTH AT 6 PM DAILY.  9/28/17  Yes Rosario Olivares MD   docusate sodium (DOK) 100 MG capsule TAKE 1 CAPSULE BY MOUTH TWICE DAILY 9/28/17  Yes Rosario Olivares MD   atorvastatin (LIPITOR) 80 MG tablet TAKE ONE TABLET BY MOUTH ONCE A DAY. 9/28/17  Yes Rosario Olivares MD   vitamin B-12 (CYANOCOBALAMIN) 1000 MCG tablet Take 1 tablet by mouth daily 9/28/17  Yes Rosario Olivares MD   gabapentin (NEURONTIN) 100 MG capsule Take 100 mg by mouth 3 times daily   Yes Historical Provider, MD   brinzolamide-brimonidine (SIMBRINZA) 1-0.2 % SUSP Place 1 drop into the right eye 2 times daily   Yes Historical Provider, MD   bimatoprost (LUMIGAN) 0.03 % ophthalmic drops Place 1 drop into the right eye nightly   Yes Historical Provider, MD   acetaminophen (TYLENOL) 325 MG tablet Take 650 mg by mouth every 6 hours as needed for Pain   Yes Historical Provider, MD   prednisoLONE acetate (PRED FORTE) 1 % ophthalmic suspension Place 1 drop into the right eye 2 times daily. Yes Historical Provider, MD   Multiple Vitamins-Minerals (CENTRUM SILVER) TABS Take 1 tablet by mouth daily.    Yes Rush Hagen MD       Future Appointments  Date Time Provider Neftali Pfeiffer   12/28/2017 9:30 AM [facility]

## 2017-12-19 NOTE — CARE COORDINATION
Upon chart review after talking w patient , noted ER visit to the [facility]- patient did not mention this during conversation.   As well noted that SW was going to be helping he/ family with finding a NH for placement as family feels patient is too much for them to care for

## 2017-12-20 NOTE — TELEPHONE ENCOUNTER
Kyleigh left message on non emergency line. She stated that she is requesting to speak to Philip Arthur they got cut off during call yesterday. Please call her at phone# provided.

## 2017-12-27 NOTE — TELEPHONE ENCOUNTER
Minieva Nena stated that she has taken pt to the ER twice and they keep sending him back home. Pt has a ulcer on his buttocks and it is stage 3..  She needs some help getting him into a facility so he can get treatment. Please advise.

## 2017-12-27 NOTE — TELEPHONE ENCOUNTER
Please find out which RN CC is covering for [facility], in the mean time it appears they have referred him to the wound clinic.  Can refer him for home care in the mean time

## 2017-12-28 PROBLEM — L89.153 SACRAL DECUBITUS ULCER, STAGE III (HCC): Status: ACTIVE | Noted: 2017-01-01

## 2017-12-28 NOTE — TELEPHONE ENCOUNTER
Hoping he will be admitted - needs urgent surgery eval- sent back to [facility] - in ED now

## 2017-12-28 NOTE — ASSESSMENT & PLAN NOTE
GI eval on hold while pt treated for decubitus ulcer  On lo dose asa, and xarelto.  Hgb stable > 9  Given mult Med problems will cont oral iron and monitor for acute bleeding

## 2017-12-28 NOTE — PROGRESS NOTES
(PRINIVIL;ZESTRIL) 30 MG tablet Take 1 tablet by mouth daily 30 tablet 5    ferrous sulfate 325 (65 Fe) MG tablet Take 1 tablet by mouth daily (with breakfast) 90 tablet 0    furosemide (LASIX) 20 MG tablet Take 1 tablet by mouth daily 30 tablet 5    pantoprazole (PROTONIX) 40 MG tablet TAKE 1 TABLET BY MOUTH EVERY DAY 30 tablet 5    allopurinol (ZYLOPRIM) 100 MG tablet Take 1 tablet by mouth daily 30 tablet 5    tamsulosin (FLOMAX) 0.4 MG capsule Take 1 capsule by mouth nightly 30 capsule 5    diltiazem (CARDIZEM CD) 120 MG extended release capsule Take 1 capsule by mouth daily 30 capsule 5    potassium chloride (KLOR-CON M) 20 MEQ extended release tablet Take 1 tablet by mouth daily 30 tablet 5    aspirin 81 MG tablet Take 1 tablet by mouth daily 30 tablet 5    folic acid (FOLVITE) 1 MG tablet TAKE 1 TABLET BY MOUTH DAILY 28 tablet 5    rivaroxaban (XARELTO) 20 MG TABS tablet Take 1 tablet by mouth every 24 hours TAKE ONE TABLET BY MOUTH AT 6 PM DAILY. 30 tablet 5    docusate sodium (DOK) 100 MG capsule TAKE 1 CAPSULE BY MOUTH TWICE DAILY 56 capsule 5    atorvastatin (LIPITOR) 80 MG tablet TAKE ONE TABLET BY MOUTH ONCE A DAY. 30 tablet 5    vitamin B-12 (CYANOCOBALAMIN) 1000 MCG tablet Take 1 tablet by mouth daily 30 tablet 5    brinzolamide-brimonidine (SIMBRINZA) 1-0.2 % SUSP Place 1 drop into the right eye 2 times daily      bimatoprost (LUMIGAN) 0.03 % ophthalmic drops Place 1 drop into the right eye nightly      acetaminophen (TYLENOL) 325 MG tablet Take 650 mg by mouth every 6 hours as needed for Pain      prednisoLONE acetate (PRED FORTE) 1 % ophthalmic suspension Place 1 drop into the right eye 2 times daily.  Multiple Vitamins-Minerals (CENTRUM SILVER) TABS Take 1 tablet by mouth daily.  30 tablet 11       Chief Complaint   Patient presents with    3 Month Follow-Up     Pt also has pressure ulcer on buttocks that needs to be looked at   per MA triage  HPI    Pt is here for follow up of chronic medical problems: back pain,PVD,HTN,afib. S/P Left SIMRAN- seen in Ed twice in the past 2 weeks ( once at [facility] and once at [facility]) for buttock pain,    Has had pain on buttocks since November. Niece took him to [facility] 12/17, then was being seen by PT at home 2 days ago was inso much pain that she called 911 - taken to [facility] ED- referred to wound care, but pt is unable to get there. Conintues to live alone, recently family got him moved to first floor. Declining now mostly in recliner all day. No longer able to care for himself     Seen by heme- and GI, GI eval pending. Felix Moy was not with him when he went , so not sure what was done. C/o severe pain in buttocks    Lab Results   Component Value Date     12/26/2017    K 4.2 12/26/2017     12/26/2017    CO2 25 12/26/2017    BUN 14 12/26/2017    CREATININE 1.2 12/26/2017    GLUCOSE 104 (H) 12/26/2017    CALCIUM 9.0 12/26/2017    PROT 7.3 10/09/2017    LABALBU 2.7 (L) 12/26/2017    BILITOT 0.3 10/09/2017    ALKPHOS 118 10/09/2017    AST 10 (L) 10/09/2017    ALT <5 (L) 10/09/2017    LABGLOM 58 (A) 12/26/2017    GFRAA >60 12/26/2017    AGRATIO 0.7 (L) 10/09/2017    GLOB 4.3 10/09/2017     Lab Results   Component Value Date    WBC 10.6 12/28/2017    HGB 8.9 (L) 12/28/2017    HCT 27.8 (L) 12/28/2017    MCV 91.2 12/28/2017     12/28/2017         Review of Systems  Review of Systems   Constitutional: Negative for chills, diaphoresis and fever. Respiratory: Negative for cough, hemoptysis, sputum production, shortness of breath and wheezing. Cardiovascular: Negative for chest pain, palpitations, orthopnea and leg swelling. Gastrointestinal: Negative for abdominal pain, blood in stool, constipation, diarrhea, heartburn, melena, nausea and vomiting. Vitals:    12/28/17 0947   BP: 135/61   Pulse: 87   SpO2: 96%     There is no height or weight on file to calculate BMI.      Wt Readings from Last 3 Encounters:   12/28/17 220 lb (99.8 kg) 10/05/17 195 lb (88.5 kg)   07/07/17 182 lb (82.6 kg)     BP Readings from Last 3 Encounters:   12/28/17 (!) 107/45   12/28/17 135/61   12/26/17 (!) 137/49        Physical Exam   Constitutional:   Uncomfortable in WC     HENT:   Head: Normocephalic. Cardiovascular:   No murmur heard. HR 90 irreg   No g/ m    Pulmonary/Chest: No respiratory distress. He has no wheezes. He has no rales. He exhibits no tenderness. Abdominal: Soft. He exhibits no distension. There is no tenderness. There is no guarding. Musculoskeletal: He exhibits no edema. Skin: He is not diaphoretic.    Large 12 cm area of skin breakdown and purulent odor from perianal area- bilateral exetending to lower back- see phot  Has powder on top-

## 2017-12-28 NOTE — TELEPHONE ENCOUNTER
Pt had appt today 12/28/17 and was referred back to [facility] ER. Dr Evans Foote spoke with Dr Becca Loredo at the ER and advised pt was coming in.

## 2017-12-28 NOTE — TELEPHONE ENCOUNTER
Steven Pappas is requesting verification that pt has 2 pressure ulcers on buttocks. Also she is requesting the ediology of right foot wound.

## 2018-01-02 PROBLEM — A41.9 SEPSIS (HCC): Status: ACTIVE | Noted: 2018-01-01

## 2018-01-03 PROBLEM — J96.90 RESPIRATORY FAILURE REQUIRING INTUBATION (HCC): Status: ACTIVE | Noted: 2018-01-01

## 2018-01-03 PROBLEM — K81.0 CHOLECYSTITIS, ACUTE: Status: ACTIVE | Noted: 2018-01-01

## 2018-01-07 PROBLEM — L89.154 SACRAL DECUBITUS ULCER, STAGE IV (HCC): Status: ACTIVE | Noted: 2017-01-01

## 2018-01-07 PROBLEM — J96.00 ACUTE RESPIRATORY FAILURE (HCC): Status: ACTIVE | Noted: 2018-01-01

## 2018-01-31 ENCOUNTER — CARE COORDINATION (OUTPATIENT)
Dept: CARE COORDINATION | Age: 83
End: 2018-01-31

## 2018-01-31 NOTE — CARE COORDINATION
Call from patient's daughter in response to letter sent re DR Hurt's [facility] and scheduling with a new physician,   patient passed away 1/14/18   Made PM aware    No future appointments.

## 2020-11-04 RX ORDER — METOPROLOL TARTRATE 50 MG/1
TABLET, FILM COATED ORAL
Qty: 56 TABLET | Refills: 0 | OUTPATIENT
Start: 2020-11-04

## 2020-11-04 RX ORDER — NIFEDIPINE 60 MG/1
TABLET, EXTENDED RELEASE ORAL
Qty: 28 TABLET | Refills: 0 | OUTPATIENT
Start: 2020-11-04